# Patient Record
Sex: MALE | Race: WHITE | NOT HISPANIC OR LATINO | Employment: FULL TIME | URBAN - METROPOLITAN AREA
[De-identification: names, ages, dates, MRNs, and addresses within clinical notes are randomized per-mention and may not be internally consistent; named-entity substitution may affect disease eponyms.]

---

## 2019-03-14 ENCOUNTER — HOSPITAL ENCOUNTER (OUTPATIENT)
Dept: OTHER | Facility: HOSPITAL | Age: 49
Setting detail: SPECIMEN
Discharge: HOME | End: 2019-03-14
Attending: UROLOGY | Admitting: UROLOGY

## 2019-03-15 LAB — SPECIMEN SOURCE: NORMAL

## 2020-11-04 ENCOUNTER — HOSPITAL ENCOUNTER (EMERGENCY)
Facility: HOSPITAL | Age: 50
Discharge: HOME | End: 2020-11-04
Admitting: EMERGENCY MEDICINE

## 2020-11-04 ENCOUNTER — APPOINTMENT (OUTPATIENT)
Dept: CT IMAGING | Facility: HOSPITAL | Age: 50
End: 2020-11-04

## 2020-11-04 VITALS
DIASTOLIC BLOOD PRESSURE: 78 MMHG | HEART RATE: 80 BPM | TEMPERATURE: 98.5 F | RESPIRATION RATE: 16 BRPM | BODY MASS INDEX: 32.9 KG/M2 | WEIGHT: 235 LBS | HEIGHT: 71 IN | OXYGEN SATURATION: 98 % | SYSTOLIC BLOOD PRESSURE: 125 MMHG

## 2020-11-04 DIAGNOSIS — R20.0 LEFT FACIAL NUMBNESS: ICD-10-CM

## 2020-11-04 DIAGNOSIS — R42 DIZZINESS: Primary | ICD-10-CM

## 2020-11-04 LAB
ANION GAP SERPL CALCULATED.3IONS-SCNC: 7 MMOL/L (ref 5–15)
BASOPHILS # BLD AUTO: 0 10*3/MM3 (ref 0–0.2)
BASOPHILS NFR BLD AUTO: 0.4 % (ref 0–1.5)
BUN SERPL-MCNC: 20 MG/DL (ref 6–20)
BUN/CREAT SERPL: 23.3 (ref 7–25)
CALCIUM SPEC-SCNC: 8.5 MG/DL (ref 8.6–10.5)
CHLORIDE SERPL-SCNC: 110 MMOL/L (ref 98–107)
CO2 SERPL-SCNC: 26 MMOL/L (ref 22–29)
CREAT SERPL-MCNC: 0.86 MG/DL (ref 0.76–1.27)
DEPRECATED RDW RBC AUTO: 41.6 FL (ref 37–54)
EOSINOPHIL # BLD AUTO: 0.1 10*3/MM3 (ref 0–0.4)
EOSINOPHIL NFR BLD AUTO: 1.9 % (ref 0.3–6.2)
ERYTHROCYTE [DISTWIDTH] IN BLOOD BY AUTOMATED COUNT: 13.6 % (ref 12.3–15.4)
GFR SERPL CREATININE-BSD FRML MDRD: 95 ML/MIN/1.73
GLUCOSE SERPL-MCNC: 82 MG/DL (ref 65–99)
HCT VFR BLD AUTO: 47.8 % (ref 37.5–51)
HGB BLD-MCNC: 15.9 G/DL (ref 13–17.7)
LYMPHOCYTES # BLD AUTO: 0.9 10*3/MM3 (ref 0.7–3.1)
LYMPHOCYTES NFR BLD AUTO: 22.8 % (ref 19.6–45.3)
MCH RBC QN AUTO: 29 PG (ref 26.6–33)
MCHC RBC AUTO-ENTMCNC: 33.2 G/DL (ref 31.5–35.7)
MCV RBC AUTO: 87.2 FL (ref 79–97)
MONOCYTES # BLD AUTO: 0.3 10*3/MM3 (ref 0.1–0.9)
MONOCYTES NFR BLD AUTO: 8.5 % (ref 5–12)
NEUTROPHILS NFR BLD AUTO: 2.6 10*3/MM3 (ref 1.7–7)
NEUTROPHILS NFR BLD AUTO: 66.4 % (ref 42.7–76)
NRBC BLD AUTO-RTO: 0.1 /100 WBC (ref 0–0.2)
NT-PROBNP SERPL-MCNC: <5 PG/ML (ref 0–450)
PLATELET # BLD AUTO: 250 10*3/MM3 (ref 140–450)
PMV BLD AUTO: 7.9 FL (ref 6–12)
POTASSIUM SERPL-SCNC: 4.2 MMOL/L (ref 3.5–5.2)
RBC # BLD AUTO: 5.48 10*6/MM3 (ref 4.14–5.8)
SODIUM SERPL-SCNC: 143 MMOL/L (ref 136–145)
TROPONIN T SERPL-MCNC: <0.01 NG/ML (ref 0–0.03)
WBC # BLD AUTO: 3.9 10*3/MM3 (ref 3.4–10.8)

## 2020-11-04 PROCEDURE — 85025 COMPLETE CBC W/AUTO DIFF WBC: CPT | Performed by: PHYSICIAN ASSISTANT

## 2020-11-04 PROCEDURE — 93005 ELECTROCARDIOGRAM TRACING: CPT | Performed by: PHYSICIAN ASSISTANT

## 2020-11-04 PROCEDURE — 70450 CT HEAD/BRAIN W/O DYE: CPT

## 2020-11-04 PROCEDURE — 83880 ASSAY OF NATRIURETIC PEPTIDE: CPT | Performed by: PHYSICIAN ASSISTANT

## 2020-11-04 PROCEDURE — 84484 ASSAY OF TROPONIN QUANT: CPT | Performed by: PHYSICIAN ASSISTANT

## 2020-11-04 PROCEDURE — 80048 BASIC METABOLIC PNL TOTAL CA: CPT | Performed by: PHYSICIAN ASSISTANT

## 2020-11-04 PROCEDURE — 99284 EMERGENCY DEPT VISIT MOD MDM: CPT

## 2020-11-04 RX ORDER — MECLIZINE HYDROCHLORIDE 25 MG/1
25 TABLET ORAL ONCE
Status: COMPLETED | OUTPATIENT
Start: 2020-11-04 | End: 2020-11-04

## 2020-11-04 RX ORDER — MECLIZINE HYDROCHLORIDE 25 MG/1
25 TABLET ORAL 3 TIMES DAILY PRN
Qty: 20 TABLET | Refills: 0 | Status: SHIPPED | OUTPATIENT
Start: 2020-11-04

## 2020-11-04 RX ORDER — AMOXICILLIN AND CLAVULANATE POTASSIUM 875; 125 MG/1; MG/1
1 TABLET, FILM COATED ORAL 2 TIMES DAILY
Qty: 14 TABLET | Refills: 0 | Status: SHIPPED | OUTPATIENT
Start: 2020-11-04

## 2020-11-04 RX ORDER — SODIUM CHLORIDE 0.9 % (FLUSH) 0.9 %
10 SYRINGE (ML) INJECTION AS NEEDED
Status: DISCONTINUED | OUTPATIENT
Start: 2020-11-04 | End: 2020-11-04 | Stop reason: HOSPADM

## 2020-11-04 RX ADMIN — MECLIZINE HYDROCHLORIDE 25 MG: 25 TABLET ORAL at 14:23

## 2020-11-04 RX ADMIN — SODIUM CHLORIDE 1000 ML: 9 INJECTION, SOLUTION INTRAVENOUS at 14:23

## 2020-11-04 NOTE — ED PROVIDER NOTES
Subjective   History:  49-year-old male who presents to the ER with 1 week of dizziness feeling like he has vertigo and difficulty hearing out of his left ear and subjective sensation loss on the left side of his face.  He reports he has follow-up scheduled with ENT on 11/17/2020 but due to his Covid status he is to wait 2 weeks.  Reports his Covid symptoms have now resolved he had cough nausea but this seems to have diminished with time.    Onset: 1 week  Location: Left ear and left side of face  Duration: Constant  Character: Difficulty hearing and subjective sensation loss on left side of face  Aggravating/Alleviating factors: None   Radiationnone  Severity: Moderate            Review of Systems   Constitutional: Negative for chills, diaphoresis, fatigue and fever.   Respiratory: Negative for cough, choking and shortness of breath.    Cardiovascular: Negative for chest pain and palpitations.   Gastrointestinal: Negative for abdominal pain, nausea and vomiting.   Genitourinary: Negative.    Musculoskeletal: Negative.    Skin: Negative.    Neurological: Positive for dizziness and numbness. Negative for syncope and weakness.       No past medical history on file.    No Known Allergies    No past surgical history on file.    No family history on file.    Social History     Socioeconomic History   • Marital status:      Spouse name: Not on file   • Number of children: Not on file   • Years of education: Not on file   • Highest education level: Not on file           Objective   Physical Exam  Vitals signs and nursing note reviewed.   Constitutional:       Appearance: He is well-developed.   HENT:      Head: Normocephalic and atraumatic.      Ears:      Comments: Left tm: white opaque discoloration noted on left TM     Nose: Nose normal.   Eyes:      Pupils: Pupils are equal, round, and reactive to light.   Neck:      Musculoskeletal: Normal range of motion.   Pulmonary:      Effort: Pulmonary effort is normal.    Musculoskeletal: Normal range of motion.   Skin:     General: Skin is warm and dry.   Neurological:      General: No focal deficit present.      Mental Status: He is alert and oriented to person, place, and time. Mental status is at baseline.      Cranial Nerves: No cranial nerve deficit.      Sensory: No sensory deficit.      Motor: No weakness.   Psychiatric:         Mood and Affect: Mood normal.         Behavior: Behavior normal.         Thought Content: Thought content normal.         Judgment: Judgment normal.         Procedures           ED Course  ED Course as of Nov 04 1648 Wed Nov 04, 2020   1528 Discussed this case with Dr. Akhtar  who agrees with plan    [MG]   1546 EKG interpreted by ER physician reviewed myself sinus rhythm rate of 66.    [MG]      ED Course User Index  [MG] Kiera Castillo PA-C      Ct Head Without Contrast    Result Date: 11/4/2020  No acute intracranial findings.  Electronically Signed By-Dr. Ysabel Nolan MD On:11/4/2020 4:02 PM This report was finalized on [id number] by Dr. Ysabel Nolan MD.    Labs Reviewed   BASIC METABOLIC PANEL - Abnormal; Notable for the following components:       Result Value    Chloride 110 (*)     Calcium 8.5 (*)     All other components within normal limits    Narrative:     GFR Normal >60  Chronic Kidney Disease <60  Kidney Failure <15     CBC WITH AUTO DIFFERENTIAL - Normal   TROPONIN (IN-HOUSE) - Normal    Narrative:     Troponin T Reference Range:  <= 0.03 ng/mL-   Negative for AMI  >0.03 ng/mL-     Abnormal for myocardial necrosis.  Clinicians would have to utilize clinical acumen, EKG, Troponin and serial changes to determine if it is an Acute Myocardial Infarction or myocardial injury due to an underlying chronic condition.       Results may be falsely decreased if patient taking Biotin.     BNP (IN-HOUSE) - Normal    Narrative:     Among patients with dyspnea, NT-proBNP is highly sensitive for the detection of acute congestive heart  failure. In addition NT-proBNP of <300 pg/ml effectively rules out acute congestive heart failure with 99% negative predictive value.    Results may be falsely decreased if patient taking Biotin.     CBC AND DIFFERENTIAL    Narrative:     The following orders were created for panel order CBC & Differential.  Procedure                               Abnormality         Status                     ---------                               -----------         ------                     CBC Auto Differential[[id number]]        Normal              Final result                 Please view results for these tests on the individual orders.     Medications   sodium chloride 0.9 % flush 10 mL (has no administration in time range)   meclizine (ANTIVERT) tablet 25 mg (25 mg Oral Given 11/4/20 1423)   sodium chloride 0.9 % bolus 1,000 mL (0 mL Intravenous Stopped 11/4/20 1518)                                          MDM  Number of Diagnoses or Management Options  Dizziness:   Left facial numbness:   Diagnosis management comments: I examined the patient using the appropriate personal protective equipment.      DISPOSITION:   Chart Review:  Comorbidity:  has no past medical history on file.  Differentials:this list is not all inclusive and does not constitute the entirety of considered causes --> acoustic neuroma, viral or bacterial infection,Tympanosclerosis    Imaging: Was interpreted by physician and reviewed by myself:  Ct Head Without Contrast    Result Date: 11/4/2020  No acute intracranial findings.  Electronically Signed By-Dr. Ysabel Nolan MD On:11/4/2020 4:02 PM This report was finalized on [id number] by Dr. Ysabel Nolan MD.      Disposition/Treatment:    Patient is a 49-year-old male who presents to the ER with dizziness left-sided facial numbness and difficulty hearing out of his left ear.  Patient does have what appears to be an ossification on his tympanic membrane but no other objective findings on physical exam  he was started on antibiotics in case this is a bacterial infection he has an appointment scheduled with[facility] in 2 weeks which he will keep.  He was stable at time of discharge return precaution and follow-up instructions were provided he was in agreement with plan       Amount and/or Complexity of Data Reviewed  Clinical lab tests: reviewed  Tests in the medicine section of CPT®: reviewed    Patient Progress  Patient progress: stable      Final diagnoses:   Dizziness   Left facial numbness            Kiera Castillo PA-C  11/04/20 5927

## 2020-11-04 NOTE — ED NOTES
Pt complains of vertigo that he was already diagnosed with. Pt is covid positive, stated he had an earache, lost hearing on the left side Monday morning. Pt states know his face is numb but pain free.     Katt Moreno RN  11/04/20 [id number]

## 2020-11-06 LAB — QT INTERVAL: 397 MS

## 2024-04-06 ENCOUNTER — APPOINTMENT (OUTPATIENT)
Dept: GENERAL RADIOLOGY | Facility: HOSPITAL | Age: 54
End: 2024-04-06
Payer: COMMERCIAL

## 2024-04-06 ENCOUNTER — APPOINTMENT (OUTPATIENT)
Dept: NUCLEAR MEDICINE | Facility: HOSPITAL | Age: 54
End: 2024-04-06
Payer: COMMERCIAL

## 2024-04-06 ENCOUNTER — HOSPITAL ENCOUNTER (OUTPATIENT)
Facility: HOSPITAL | Age: 54
Discharge: HOME | End: 2024-04-06
Attending: EMERGENCY MEDICINE | Admitting: HOSPITALIST
Payer: COMMERCIAL

## 2024-04-06 VITALS
WEIGHT: 288.8 LBS | BODY MASS INDEX: 40.43 KG/M2 | RESPIRATION RATE: 15 BRPM | DIASTOLIC BLOOD PRESSURE: 68 MMHG | HEIGHT: 71 IN | HEART RATE: 58 BPM | SYSTOLIC BLOOD PRESSURE: 113 MMHG | OXYGEN SATURATION: 100 % | TEMPERATURE: 97.5 F

## 2024-04-06 DIAGNOSIS — R07.9 CHEST PAIN, UNSPECIFIED TYPE: Primary | ICD-10-CM

## 2024-04-06 LAB
ALBUMIN SERPL-MCNC: 4.3 G/DL (ref 3.5–5.2)
ALBUMIN/GLOB SERPL: 1.7 G/DL
ALP SERPL-CCNC: 65 U/L (ref 39–117)
ALT SERPL W P-5'-P-CCNC: 18 U/L (ref 1–41)
ANION GAP SERPL CALCULATED.3IONS-SCNC: 9 MMOL/L (ref 5–15)
AST SERPL-CCNC: 19 U/L (ref 1–40)
BASOPHILS # BLD AUTO: 0.03 10*3/MM3 (ref 0–0.2)
BASOPHILS NFR BLD AUTO: 0.6 % (ref 0–1.5)
BH CV REST NUCLEAR ISOTOPE DOSE: 9.8 MCI
BH CV STRESS BP STAGE 1: NORMAL
BH CV STRESS BP STAGE 2: NORMAL
BH CV STRESS BP STAGE 3: NORMAL
BH CV STRESS DURATION MIN STAGE 1: 3
BH CV STRESS DURATION MIN STAGE 2: 3
BH CV STRESS DURATION MIN STAGE 3: 3
BH CV STRESS DURATION SEC STAGE 1: 0
BH CV STRESS DURATION SEC STAGE 2: 0
BH CV STRESS DURATION SEC STAGE 3: 0
BH CV STRESS GRADE STAGE 1: 10
BH CV STRESS GRADE STAGE 2: 12
BH CV STRESS GRADE STAGE 3: 14
BH CV STRESS HR STAGE 1: 78
BH CV STRESS HR STAGE 2: 120
BH CV STRESS HR STAGE 3: 146
BH CV STRESS METS STAGE 1: 5
BH CV STRESS METS STAGE 2: 7.5
BH CV STRESS METS STAGE 3: 10
BH CV STRESS NUCLEAR ISOTOPE DOSE: 30 MCI
BH CV STRESS PROTOCOL 1: NORMAL
BH CV STRESS RECOVERY BP: NORMAL MMHG
BH CV STRESS RECOVERY HR: 85 BPM
BH CV STRESS SPEED STAGE 1: 1.7
BH CV STRESS SPEED STAGE 2: 2.5
BH CV STRESS SPEED STAGE 3: 3.4
BH CV STRESS STAGE 1: 1
BH CV STRESS STAGE 2: 2
BH CV STRESS STAGE 3: 3
BILIRUB SERPL-MCNC: 0.4 MG/DL (ref 0–1.2)
BUN SERPL-MCNC: 23 MG/DL (ref 6–20)
BUN/CREAT SERPL: 19.8 (ref 7–25)
CALCIUM SPEC-SCNC: 9.2 MG/DL (ref 8.6–10.5)
CHLORIDE SERPL-SCNC: 102 MMOL/L (ref 98–107)
CO2 SERPL-SCNC: 29 MMOL/L (ref 22–29)
CREAT SERPL-MCNC: 1.16 MG/DL (ref 0.76–1.27)
D DIMER PPP FEU-MCNC: 0.32 MG/L (FEU) (ref 0–0.51)
DEPRECATED RDW RBC AUTO: 42.8 FL (ref 37–54)
EGFRCR SERPLBLD CKD-EPI 2021: 76.3 ML/MIN/1.73
EOSINOPHIL # BLD AUTO: 0.15 10*3/MM3 (ref 0–0.4)
EOSINOPHIL NFR BLD AUTO: 3.2 % (ref 0.3–6.2)
ERYTHROCYTE [DISTWIDTH] IN BLOOD BY AUTOMATED COUNT: 13.7 % (ref 12.3–15.4)
GLOBULIN UR ELPH-MCNC: 2.6 GM/DL
GLUCOSE SERPL-MCNC: 107 MG/DL (ref 65–99)
HCT VFR BLD AUTO: 47 % (ref 37.5–51)
HGB BLD-MCNC: 14.8 G/DL (ref 13–17.7)
HOLD SPECIMEN: NORMAL
HOLD SPECIMEN: NORMAL
IMM GRANULOCYTES # BLD AUTO: 0.01 10*3/MM3 (ref 0–0.05)
IMM GRANULOCYTES NFR BLD AUTO: 0.2 % (ref 0–0.5)
LV EF NUC BP: 78 %
LYMPHOCYTES # BLD AUTO: 1.16 10*3/MM3 (ref 0.7–3.1)
LYMPHOCYTES NFR BLD AUTO: 25.1 % (ref 19.6–45.3)
MAGNESIUM SERPL-MCNC: 2.2 MG/DL (ref 1.6–2.6)
MAXIMAL PREDICTED HEART RATE: 169 BPM
MCH RBC QN AUTO: 27.3 PG (ref 26.6–33)
MCHC RBC AUTO-ENTMCNC: 31.5 G/DL (ref 31.5–35.7)
MCV RBC AUTO: 86.6 FL (ref 79–97)
MONOCYTES # BLD AUTO: 0.38 10*3/MM3 (ref 0.1–0.9)
MONOCYTES NFR BLD AUTO: 8.2 % (ref 5–12)
NEUTROPHILS NFR BLD AUTO: 2.9 10*3/MM3 (ref 1.7–7)
NEUTROPHILS NFR BLD AUTO: 62.7 % (ref 42.7–76)
NRBC BLD AUTO-RTO: 0 /100 WBC (ref 0–0.2)
PERCENT MAX PREDICTED HR: 86.39 %
PLATELET # BLD AUTO: 228 10*3/MM3 (ref 140–450)
PMV BLD AUTO: 9.6 FL (ref 6–12)
POTASSIUM SERPL-SCNC: 4 MMOL/L (ref 3.5–5.2)
PROT SERPL-MCNC: 6.9 G/DL (ref 6–8.5)
RBC # BLD AUTO: 5.43 10*6/MM3 (ref 4.14–5.8)
SODIUM SERPL-SCNC: 140 MMOL/L (ref 136–145)
STRESS BASELINE BP: NORMAL MMHG
STRESS BASELINE HR: 62 BPM
STRESS PERCENT HR: 102 %
STRESS POST EXERCISE DUR MIN: 6 MIN
STRESS POST EXERCISE DUR SEC: 30 SEC
STRESS POST PEAK BP: NORMAL MMHG
STRESS POST PEAK HR: 146 BPM
STRESS TARGET HR: 144 BPM
T4 FREE SERPL-MCNC: 1.13 NG/DL (ref 0.93–1.7)
TROPONIN T SERPL HS-MCNC: 7 NG/L
TROPONIN T SERPL HS-MCNC: <6 NG/L
TSH SERPL DL<=0.05 MIU/L-ACNC: 1.86 UIU/ML (ref 0.27–4.2)
WBC NRBC COR # BLD AUTO: 4.63 10*3/MM3 (ref 3.4–10.8)
WHOLE BLOOD HOLD COAG: NORMAL
WHOLE BLOOD HOLD SPECIMEN: NORMAL

## 2024-04-06 PROCEDURE — 93017 CV STRESS TEST TRACING ONLY: CPT

## 2024-04-06 PROCEDURE — A9502 TC99M TETROFOSMIN: HCPCS | Performed by: HOSPITALIST

## 2024-04-06 PROCEDURE — G0378 HOSPITAL OBSERVATION PER HR: HCPCS

## 2024-04-06 PROCEDURE — 0 TECHNETIUM TETROFOSMIN KIT: Performed by: HOSPITALIST

## 2024-04-06 PROCEDURE — 85379 FIBRIN DEGRADATION QUANT: CPT | Performed by: EMERGENCY MEDICINE

## 2024-04-06 PROCEDURE — 93018 CV STRESS TEST I&R ONLY: CPT | Performed by: INTERNAL MEDICINE

## 2024-04-06 PROCEDURE — 78452 HT MUSCLE IMAGE SPECT MULT: CPT

## 2024-04-06 PROCEDURE — 71045 X-RAY EXAM CHEST 1 VIEW: CPT

## 2024-04-06 PROCEDURE — 78452 HT MUSCLE IMAGE SPECT MULT: CPT | Performed by: INTERNAL MEDICINE

## 2024-04-06 PROCEDURE — 83735 ASSAY OF MAGNESIUM: CPT | Performed by: EMERGENCY MEDICINE

## 2024-04-06 PROCEDURE — 84484 ASSAY OF TROPONIN QUANT: CPT | Performed by: EMERGENCY MEDICINE

## 2024-04-06 PROCEDURE — 84439 ASSAY OF FREE THYROXINE: CPT | Performed by: EMERGENCY MEDICINE

## 2024-04-06 PROCEDURE — 36415 COLL VENOUS BLD VENIPUNCTURE: CPT

## 2024-04-06 PROCEDURE — 99285 EMERGENCY DEPT VISIT HI MDM: CPT

## 2024-04-06 PROCEDURE — 93005 ELECTROCARDIOGRAM TRACING: CPT

## 2024-04-06 PROCEDURE — 93016 CV STRESS TEST SUPVJ ONLY: CPT | Performed by: INTERNAL MEDICINE

## 2024-04-06 PROCEDURE — 80050 GENERAL HEALTH PANEL: CPT | Performed by: EMERGENCY MEDICINE

## 2024-04-06 PROCEDURE — 84484 ASSAY OF TROPONIN QUANT: CPT | Performed by: HOSPITALIST

## 2024-04-06 RX ORDER — NITROGLYCERIN 0.4 MG/1
0.4 TABLET SUBLINGUAL
Status: DISCONTINUED | OUTPATIENT
Start: 2024-04-06 | End: 2024-04-06 | Stop reason: HOSPADM

## 2024-04-06 RX ORDER — ASPIRIN 325 MG
325 TABLET ORAL ONCE
Status: COMPLETED | OUTPATIENT
Start: 2024-04-06 | End: 2024-04-06

## 2024-04-06 RX ORDER — BISACODYL 10 MG
10 SUPPOSITORY, RECTAL RECTAL DAILY PRN
Status: DISCONTINUED | OUTPATIENT
Start: 2024-04-06 | End: 2024-04-06 | Stop reason: HOSPADM

## 2024-04-06 RX ORDER — NALOXONE HCL 0.4 MG/ML
0.4 VIAL (ML) INJECTION
Status: DISCONTINUED | OUTPATIENT
Start: 2024-04-06 | End: 2024-04-06 | Stop reason: HOSPADM

## 2024-04-06 RX ORDER — SODIUM CHLORIDE 0.9 % (FLUSH) 0.9 %
10 SYRINGE (ML) INJECTION AS NEEDED
Status: DISCONTINUED | OUTPATIENT
Start: 2024-04-06 | End: 2024-04-06 | Stop reason: HOSPADM

## 2024-04-06 RX ORDER — SODIUM CHLORIDE 9 MG/ML
100 INJECTION, SOLUTION INTRAVENOUS CONTINUOUS
Status: DISCONTINUED | OUTPATIENT
Start: 2024-04-06 | End: 2024-04-06 | Stop reason: HOSPADM

## 2024-04-06 RX ORDER — AMOXICILLIN 250 MG
2 CAPSULE ORAL 2 TIMES DAILY
Status: DISCONTINUED | OUTPATIENT
Start: 2024-04-06 | End: 2024-04-06 | Stop reason: HOSPADM

## 2024-04-06 RX ORDER — ESCITALOPRAM OXALATE 20 MG/1
20 TABLET ORAL DAILY
COMMUNITY

## 2024-04-06 RX ORDER — OMEPRAZOLE 40 MG/1
40 CAPSULE, DELAYED RELEASE ORAL DAILY
COMMUNITY

## 2024-04-06 RX ORDER — BISACODYL 5 MG/1
5 TABLET, DELAYED RELEASE ORAL DAILY PRN
Status: DISCONTINUED | OUTPATIENT
Start: 2024-04-06 | End: 2024-04-06 | Stop reason: HOSPADM

## 2024-04-06 RX ORDER — POLYETHYLENE GLYCOL 3350 17 G/17G
17 POWDER, FOR SOLUTION ORAL DAILY PRN
Status: DISCONTINUED | OUTPATIENT
Start: 2024-04-06 | End: 2024-04-06 | Stop reason: HOSPADM

## 2024-04-06 RX ORDER — SODIUM CHLORIDE 0.9 % (FLUSH) 0.9 %
10 SYRINGE (ML) INJECTION EVERY 12 HOURS SCHEDULED
Status: DISCONTINUED | OUTPATIENT
Start: 2024-04-06 | End: 2024-04-06 | Stop reason: HOSPADM

## 2024-04-06 RX ORDER — ASPIRIN 81 MG/1
81 TABLET ORAL DAILY
Status: DISCONTINUED | OUTPATIENT
Start: 2024-04-07 | End: 2024-04-06 | Stop reason: HOSPADM

## 2024-04-06 RX ORDER — SODIUM CHLORIDE 9 MG/ML
40 INJECTION, SOLUTION INTRAVENOUS AS NEEDED
Status: DISCONTINUED | OUTPATIENT
Start: 2024-04-06 | End: 2024-04-06 | Stop reason: HOSPADM

## 2024-04-06 RX ADMIN — TETROFOSMIN 1 DOSE: 1.38 INJECTION, POWDER, LYOPHILIZED, FOR SOLUTION INTRAVENOUS at 13:55

## 2024-04-06 RX ADMIN — ASPIRIN 325 MG ORAL TABLET 325 MG: 325 PILL ORAL at 08:08

## 2024-04-06 RX ADMIN — TETROFOSMIN 1 DOSE: 1.38 INJECTION, POWDER, LYOPHILIZED, FOR SOLUTION INTRAVENOUS at 12:56

## 2024-04-06 NOTE — Clinical Note
Level of Care: Telemetry [5]   Admitting Physician: KADE CALDERON [[id number]]   Attending Physician: KADE CALDERON [[id number]]

## 2024-04-06 NOTE — H&P
[facility] Medicine Services  History & Physical    Patient Name: Los Thomas  : 1970  MRN: [id number]  Primary Care Physician:  Hussein Gallego MD  Date of admission: 2024  Date and Time of Service: 2024 at 12:55 EDT      Subjective      Chief Complaint: Chest Pain    History of Present Illness: Los Thomas is a 53 y.o. male with a PMH of Depression and GERD who presented to [facility] on 2024 with  c/o chest pain.  Patient stated that he has had chest pain on and off for the past 1 week. He is also had some palpitations with his Apple Watch reading his heart rate in the low 100s.  Patient states that his chest pain is located in the epigastric region and rates as a 6/10.  States that the pain is nonradiating.  Had associated shortness of breath this morning with chest pain prompting him to come to the ED.  Denies any other symptoms.  Denies any history of cardiac disease.       Review of Systems  As above  Personal History     No past medical history on file.    No past surgical history on file.    Family History: family history is not on file. Otherwise pertinent FHx was reviewed and not pertinent to current issue.    Social History:      Home Medications:  Prior to Admission Medications       Prescriptions Last Dose Informant Patient Reported? Taking?    escitalopram (LEXAPRO) 20 MG tablet 2024  Yes Yes    Take 1 tablet by mouth Daily.    omeprazole (priLOSEC) 40 MG capsule 2024  Yes Yes    Take 1 capsule by mouth Daily.              Allergies:  No Known Allergies    Objective      Vitals:   Temp:  [98.1 °F (36.7 °C)] 98.1 °F (36.7 °C)  Heart Rate:  [66-79] 71  Resp:  [20] 20  BP: (111-144)/(75-91) 121/82  Body mass index is 40.28 kg/m².  Physical Exam  General Appearance: AOO x 4, cooperative, no distress, appropriate for age  Head:  Normocephalic, without obvious abnormality  Eyes:  PERRL, EOM's intact, conjunctivae and cornea clear  Nose:  Nares  symmetrical, septum midline, mucosa pink  Throat:  Lips, tongue, and mucosa are moist, pink, and intact  Neck:  Supple; symmetrical, trachea midline, no adenopathy  Back:  Symmetrical, ROM normal, no CVA tenderness  Lungs: Respirations unlabored, no audible wheeze  Heart: Regular rate & rhythm, S1 and S2 normal  Abdomen:  Soft, nontender, bowel sounds active all four quadrants  Musculoskeletal: Tone and strength strong and symmetrical, all extremities; no joint pain or edema         Skin/Hair/Nails:  Skin warm, dry and intact, no rashes or abnormal dyspigmentation     Diagnostic Data:  Lab Results (last 24 hours)       Procedure Component Value Units Date/Time    Novi Draw [[id number]] Collected: 04/06/24 0758    Specimen: Blood Updated: 04/06/24 0901    Narrative:      The following orders were created for panel order Novi Draw.  Procedure                               Abnormality         Status                     ---------                               -----------         ------                     Green Top (Gel)[[id number]]                                  Final result               Lavender Top[[id number]]                                     Final result               Gold Top - SST[[id number]]                                   Final result               Light Blue Top[[id number]]                                   Final result                 Please view results for these tests on the individual orders.    Green Top (Gel) [[id number]] Collected: 04/06/24 0758    Specimen: Blood Updated: 04/06/24 0901     Extra Tube Hold for add-ons.     Comment: Auto resulted.       Lavender Top [[id number]] Collected: 04/06/24 0758    Specimen: Blood Updated: 04/06/24 0901     Extra Tube hold for add-on     Comment: Auto resulted       Gold Top - SST [[id number]] Collected: 04/06/24 0758    Specimen: Blood Updated: 04/06/24 0901     Extra Tube Hold for add-ons.     Comment: Auto resulted.       Light Blue Top [[id number]]  Collected: 04/06/24 0758    Specimen: Blood Updated: 04/06/24 0901     Extra Tube Hold for add-ons.     Comment: Auto resulted       Single High Sensitivity Troponin T [[id number]]  (Normal) Collected: 04/06/24 0758    Specimen: Blood Updated: 04/06/24 0843     HS Troponin T 7 ng/L     Narrative:      High Sensitive Troponin T Reference Range:  <14.0 ng/L- Negative Female for AMI  <22.0 ng/L- Negative Male for AMI  >=14 - Abnormal Female indicating possible myocardial injury.  >=22 - Abnormal Male indicating possible myocardial injury.   Clinicians would have to utilize clinical acumen, EKG, Troponin, and serial changes to determine if it is an Acute Myocardial Infarction or myocardial injury due to an underlying chronic condition.         TSH [[id number]]  (Normal) Collected: 04/06/24 0758    Specimen: Blood Updated: 04/06/24 0843     TSH 1.860 uIU/mL     Comprehensive Metabolic Panel [[id number]]  (Abnormal) Collected: 04/06/24 0758    Specimen: Blood Updated: 04/06/24 0836     Glucose 107 mg/dL      BUN 23 mg/dL      Creatinine 1.16 mg/dL      Sodium 140 mmol/L      Potassium 4.0 mmol/L      Chloride 102 mmol/L      CO2 29.0 mmol/L      Calcium 9.2 mg/dL      Total Protein 6.9 g/dL      Albumin 4.3 g/dL      ALT (SGPT) 18 U/L      AST (SGOT) 19 U/L      Alkaline Phosphatase 65 U/L      Total Bilirubin 0.4 mg/dL      Globulin 2.6 gm/dL      A/G Ratio 1.7 g/dL      BUN/Creatinine Ratio 19.8     Anion Gap 9.0 mmol/L      eGFR 76.3 mL/min/1.73     Narrative:      GFR Normal >60  Chronic Kidney Disease <60  Kidney Failure <15      Magnesium [[id number]]  (Normal) Collected: 04/06/24 0758    Specimen: Blood Updated: 04/06/24 0836     Magnesium 2.2 mg/dL     D-dimer, Quantitative [[id number]]  (Normal) Collected: 04/06/24 0758    Specimen: Blood Updated: 04/06/24 0822     D-Dimer, Quantitative 0.32 mg/L (FEU)     Narrative:      According to the assay 's published package insert, a normal (<0.50 mg/L (FEU))  "D-dimer result in conjunction with a non-high clinical probability assessment, excludes deep vein thrombosis (DVT) and pulmonary embolism (PE) with high sensitivity.    D-dimer values increase with age and this can make VTE exclusion of an older population difficult. To address this, the [employer], based on best available evidence and recent guidelines, recommends that clinicians use age-adjusted D-dimer thresholds in patients greater than 50 years of age with: a) a low probability of PE who do not meet all Pulmonary Embolism Rule Out Criteria, or b) in those with intermediate probability of PE.   The formula for an age-adjusted D-dimer cut-off is \"age/100\".  For example, a 60 year old patient would have an age-adjusted cut-off of 0.60 mg/L (FEU) and an 80 year old 0.80 mg/L (FEU).    CBC & Differential [[id number]]  (Normal) Collected: 04/06/24 0758    Specimen: Blood Updated: 04/06/24 0812    Narrative:      The following orders were created for panel order CBC & Differential.  Procedure                               Abnormality         Status                     ---------                               -----------         ------                     CBC Auto Differential[[id number]]        Normal              Final result                 Please view results for these tests on the individual orders.    CBC Auto Differential [[id number]]  (Normal) Collected: 04/06/24 0758    Specimen: Blood Updated: 04/06/24 0812     WBC 4.63 10*3/mm3      RBC 5.43 10*6/mm3      Hemoglobin 14.8 g/dL      Hematocrit 47.0 %      MCV 86.6 fL      MCH 27.3 pg      MCHC 31.5 g/dL      RDW 13.7 %      RDW-SD 42.8 fl      MPV 9.6 fL      Platelets 228 10*3/mm3      Neutrophil % 62.7 %      Lymphocyte % 25.1 %      Monocyte % 8.2 %      Eosinophil % 3.2 %      Basophil % 0.6 %      Immature Grans % 0.2 %      Neutrophils, Absolute 2.90 10*3/mm3      Lymphocytes, Absolute 1.16 10*3/mm3      Monocytes, Absolute 0.38 10*3/mm3  "     Eosinophils, Absolute 0.15 10*3/mm3      Basophils, Absolute 0.03 10*3/mm3      Immature Grans, Absolute 0.01 10*3/mm3      nRBC 0.0 /100 WBC     T4, Free [[id number]] Collected: 04/06/24 0758    Specimen: Blood Updated: 04/06/24 0810             Imaging Results (Last 24 Hours)       Procedure Component Value Units Date/Time    XR Chest 1 View [[id number]] Collected: 04/06/24 0854     Updated: 04/06/24 0857    Narrative:        XR CHEST 1 VW    Date of Exam: 4/6/2024 8:15 AM EDT    Indication: chest pain, palpitations    Comparison: February 11, 2011    FINDINGS:  No definite focal or diffuse pulmonary infiltrate is identified.  No pneumothorax or significant pleural effusion.  Heart size and mediastinal contour appear within normal limits.  No definite osseous abnormality is seen on this limited single view.      Impression:      No radiographic findings of acute cardiopulmonary abnormality.      Electronically Signed: Ton Arpit    4/6/2024 8:55 AM EDT    Workstation ID: [id number]              Assessment & Plan        This is a 53 y.o. male with PMH of    Active and Resolved Problems  Active Hospital Problems    Diagnosis  POA    **Chest pain [R07.9]  Yes      Resolved Hospital Problems   No resolved problems to display.       Chest pain  Troponin negative  Trend troponin  Make patient n.p.o.  Obtain stress test  Add aspirin and morphine   Possible discharge today if stress test is negative    GERD  Resume Protonix    Depression  Resume Lexapro    DVT prophylaxis:  No DVT prophylaxis order currently exists.          CODE STATUS:           Admission Status:  I believe this patient meets Obs status.      I discussed the patient's findings and my recommendations with patient.      Signature:     This document has been electronically signed by Rita Albright MD on April 6, 2024 10:08 EDT   [facility] Hospitalist Team

## 2024-04-06 NOTE — ED NOTES
Nursing report ED to floor  Los Thomas  53 y.o.  male    HPI:   Chief Complaint   Patient presents with    Chest Pain       Admitting doctor:   Rita Albright MD    Admitting diagnosis:   The encounter diagnosis was Chest pain, unspecified type.    Code status:   Current Code Status       Date Active Code Status Order ID Comments User Context       Not on file            Allergies:   Patient has no known allergies.    Isolation:  No active isolations     Fall Risk:  Fall Risk Assessment was completed, and patient is at low risk for falls.   Predictive Model Details         3 (Low) Factor Value    Calculated 4/6/2024 12:08 Age 53    Risk of Fall Model Musculoskeletal Assessment WDL     Active Peripheral IV Present     Imaging order in this encounter Present     Respiratory Rate 20     Magnesium 2.2 mg/dL     Drug Use Not Asked     Skin Assessment WDL     Diastolic BP 71     Feliciano Scale not on file     Financial Class Private Insurance     Peripheral Vascular Assessment WDL     Tobacco Use Not Asked     Clinically Relevant Sex Not Female     Gastrointestinal Assessment WDL     Number of Distinct Medication Classes administered 1     Calcium 9.2 mg/dL     Chloride 102 mmol/L     Cardiac Assessment WDL     Creatinine 1.16 mg/dL     Days after Admission 0.181     Albumin 4.3 g/dL     Total Bilirubin 0.4 mg/dL     Potassium 4 mmol/L     ALT 18 U/L         Weight:       04/06/24  0743   Weight: 131 kg (288 lb 12.8 oz)       Intake and Output  No intake or output data in the 24 hours ending 04/06/24 1208    Diet:   Dietary Orders (From admission, onward)       Start     Ordered    04/06/24 1007  NPO Diet NPO Type: Strict NPO  Diet Effective Now        Question:  NPO Type  Answer:  Strict NPO    04/06/24 1007                     Most recent vitals:   Vitals:    04/06/24 1116 04/06/24 1130 04/06/24 1145 04/06/24 1201   BP: 108/66 112/71 117/75 111/71   Pulse: 65 64 74 60   Resp:       Temp:   98.1 °F (36.7 °C)     TempSrc:   Oral    SpO2: 95% 96% 98% 96%   Weight:       Height:           Active LDAs/IV Access:   Lines, Drains & Airways       Active LDAs       Name Placement date Placement time Site Days    Peripheral IV 04/06/24 0759 Left Antecubital 04/06/24  0759  Antecubital  less than 1                    Skin Condition:   Skin Assessments (last day)       None             Labs (abnormal labs have a star):   Labs Reviewed   COMPREHENSIVE METABOLIC PANEL - Abnormal; Notable for the following components:       Result Value    Glucose 107 (*)     BUN 23 (*)     All other components within normal limits    Narrative:     GFR Normal >60  Chronic Kidney Disease <60  Kidney Failure <15     SINGLE HS TROPONIN T - Normal    Narrative:     High Sensitive Troponin T Reference Range:  <14.0 ng/L- Negative Female for AMI  <22.0 ng/L- Negative Male for AMI  >=14 - Abnormal Female indicating possible myocardial injury.  >=22 - Abnormal Male indicating possible myocardial injury.   Clinicians would have to utilize clinical acumen, EKG, Troponin, and serial changes to determine if it is an Acute Myocardial Infarction or myocardial injury due to an underlying chronic condition.        D-DIMER, QUANTITATIVE - Normal    Narrative:     According to the assay 's published package insert, a normal (<0.50 mg/L (FEU)) D-dimer result in conjunction with a non-high clinical probability assessment, excludes deep vein thrombosis (DVT) and pulmonary embolism (PE) with high sensitivity.    D-dimer values increase with age and this can make VTE exclusion of an older population difficult. To address this, the [employer], based on best available evidence and recent guidelines, recommends that clinicians use age-adjusted D-dimer thresholds in patients greater than 50 years of age with: a) a low probability of PE who do not meet all Pulmonary Embolism Rule Out Criteria, or b) in those with intermediate probability of PE.  "  The formula for an age-adjusted D-dimer cut-off is \"age/100\".  For example, a 60 year old patient would have an age-adjusted cut-off of 0.60 mg/L (FEU) and an 80 year old 0.80 mg/L (FEU).   MAGNESIUM - Normal   TSH - Normal   T4, FREE - Normal    Narrative:     Results may be falsely increased if patient taking Biotin.     CBC WITH AUTO DIFFERENTIAL - Normal   RAINBOW DRAW    Narrative:     The following orders were created for panel order [place] Draw.  Procedure                               Abnormality         Status                     ---------                               -----------         ------                     Green Top (Gel)[[id number]]                                  Final result               Lavender Top[[id number]]                                     Final result               Gold Top - SST[[id number]]                                   Final result               Light Blue Top[[id number]]                                   Final result                 Please view results for these tests on the individual orders.   SINGLE HS TROPONIN T   CBC AND DIFFERENTIAL    Narrative:     The following orders were created for panel order CBC & Differential.  Procedure                               Abnormality         Status                     ---------                               -----------         ------                     CBC Auto Differential[[id number]]        Normal              Final result                 Please view results for these tests on the individual orders.   GREEN TOP   LAVENDER TOP   GOLD TOP - SST   LIGHT BLUE TOP       LOC: Person, Place, Time, and Situation    Telemetry:  Telemetry    Cardiac Monitoring Ordered: yes    EKG:   ECG 12 Lead Chest Pain   Preliminary Result   HEART RATE= 75  bpm   RR Interval= 804  ms   NH Interval= 141  ms   P Horizontal Axis= 47  deg   P Front Axis= 43  deg   QRSD Interval= 103  ms   QT Interval= 382  ms   QTcB= 426  ms   QRS Axis= -9  deg   T Wave Axis= " 22  deg   - NORMAL ECG -   Sinus rhythm   Electronically Signed By:    Date and Time of Study: 2024-04-06 07:43:06          Medications Given in the ED:   Medications   sodium chloride 0.9 % flush 10 mL (has no administration in time range)   nitroglycerin (NITROSTAT) SL tablet 0.4 mg (has no administration in time range)   sodium chloride 0.9 % flush 10 mL (has no administration in time range)   sodium chloride 0.9 % flush 10 mL (has no administration in time range)   sodium chloride 0.9 % infusion 40 mL (has no administration in time range)   sennosides-docusate (PERICOLACE) 8.6-50 MG per tablet 2 tablet (has no administration in time range)     And   polyethylene glycol (MIRALAX) packet 17 g (has no administration in time range)     And   bisacodyl (DULCOLAX) EC tablet 5 mg (has no administration in time range)     And   bisacodyl (DULCOLAX) suppository 10 mg (has no administration in time range)   sodium chloride 0.9 % infusion (has no administration in time range)   morphine injection 4 mg (has no administration in time range)     And   naloxone (NARCAN) injection 0.4 mg (has no administration in time range)   aspirin tablet 325 mg (325 mg Oral Given 4/6/24 0808)       Imaging results:  XR Chest 1 View    Result Date: 4/6/2024  No radiographic findings of acute cardiopulmonary abnormality. Electronically Signed: Ton Munson  4/6/2024 8:55 AM EDT  Workstation ID: [id number]     Social issues:   Social History     Socioeconomic History    Marital status:        NIH Stroke Scale:  Interval: (not recorded)  1a. Level of Consciousness: (not recorded)  1b. LOC Questions: (not recorded)  1c. LOC Commands: (not recorded)  2. Best Gaze: (not recorded)  3. Visual: (not recorded)  4. Facial Palsy: (not recorded)  5a. Motor Arm, Left: (not recorded)  5b. Motor Arm, Right: (not recorded)  6a. Motor Leg, Left: (not recorded)  6b. Motor Leg, Right: (not recorded)  7. Limb Ataxia: (not recorded)  8. Sensory: (not  recorded)  9. Best Language: (not recorded)  10. Dysarthria: (not recorded)  11. Extinction and Inattention (formerly Neglect): (not recorded)    Total (NIH Stroke Scale): (not recorded)     Additional notable assessment information:      Nursing report ED to floor:  RONALD Chang RN   04/06/24 12:08 EDT

## 2024-04-06 NOTE — ED NOTES
Per Ryne with nuclear medicine, pt needs second troponin drawn STAT before stress test may be performed. Report given to Ryne, order for second troponin in.

## 2024-04-06 NOTE — DISCHARGE SUMMARY
"             [facility] Medicine Services  Discharge Summary    Date of Service: 24    Patient Name: Los Thomas  : 1970  MRN: [id number]    Date of Admission: 2024  Discharge Diagnosis: Chest pain  Date of Discharge:  24    Primary Care Physician: Hussein Gallego MD      Presenting Problem:   Chest pain [R07.9]  Chest pain, unspecified type [R07.9]    Active and Resolved Hospital Problems:  Active Hospital Problems    Diagnosis POA    **Chest pain [R07.9] Yes      Resolved Hospital Problems   No resolved problems to display.         Hospital Course     HPI:  Per the H&P written by Dr. Albright, dated 24:  \"Los Thomas is a 53 y.o. male with a PMH of Depression and GERD who presented to [facility] on 2024 with  c/o chest pain.  Patient stated that he has had chest pain on and off for the past 1 week. He is also had some palpitations with his Apple Watch reading his heart rate in the low 100s.  Patient states that his chest pain is located in the epigastric region and rates as a 6/10.  States that the pain is nonradiating.  Had associated shortness of breath this morning with chest pain prompting him to come to the ED.  Denies any other symptoms.  Denies any history of cardiac disease. \"    Hospital Course:  Patient was admitted and troponin trended.  He was made n.p.o. and stress test ordered.  Pulmonary remained negative.  Patient underwent stress test which was unrevealing for any coronary artery disease.  Patient's chest pain has resolved. Patient instructed to continue taking his omeprazole for GERD as chest pain might be related to his GERD.  Discharged in stable condition.      DISCHARGE Follow Up Recommendations for labs and diagnostics:       Reasons For Change In Medications and Indications for New Medications:      Day of Discharge     Vital Signs:  Temp:  [97.5 °F (36.4 °C)-98.1 °F (36.7 °C)] 97.5 °F (36.4 °C)  Heart Rate:  [58-79] 58  Resp:  " [15-20] 15  BP: (108-144)/(65-91) 113/68    Physical Exam:  Physical Exam   General Appearance: AOO x 4, cooperative, no distress, appropriate for age  Head:  Normocephalic, without obvious abnormality  Eyes:  PERRL, EOM's intact, conjunctivae and cornea clear  Nose:  Nares symmetrical, septum midline, mucosa pink  Throat:  Lips, tongue, and mucosa are moist, pink, and intact  Neck:  Supple; symmetrical, trachea midline, no adenopathy  Back:  Symmetrical, ROM normal, no CVA tenderness  Lungs: Respirations unlabored, no audible wheeze  Heart: Regular rate & rhythm, S1 and S2 normal  Abdomen:  Soft, nontender, bowel sounds active all four quadrants  Musculoskeletal: Tone and strength strong and symmetrical, all extremities; no joint pain or edema         Skin/Hair/Nails:  Skin warm, dry and intact, no rashes or abnormal dyspigmentation         Pertinent  and/or Most Recent Results     LAB RESULTS:      Lab 04/06/24  0758   WBC 4.63   HEMOGLOBIN 14.8   HEMATOCRIT 47.0   PLATELETS 228   NEUTROS ABS 2.90   IMMATURE GRANS (ABS) 0.01   LYMPHS ABS 1.16   MONOS ABS 0.38   EOS ABS 0.15   MCV 86.6         Lab 04/06/24  0758   SODIUM 140   POTASSIUM 4.0   CHLORIDE 102   CO2 29.0   ANION GAP 9.0   BUN 23*   CREATININE 1.16   EGFR 76.3   GLUCOSE 107*   CALCIUM 9.2   MAGNESIUM 2.2   TSH 1.860         Lab 04/06/24  0758   TOTAL PROTEIN 6.9   ALBUMIN 4.3   GLOBULIN 2.6   ALT (SGPT) 18   AST (SGOT) 19   BILIRUBIN 0.4   ALK PHOS 65         Lab 04/06/24  1213 04/06/24  0758   HSTROP T <6 7                 Brief Urine Lab Results       None          Microbiology Results (last 10 days)       ** No results found for the last 240 hours. **            XR Chest 1 View    Result Date: 4/6/2024  Impression: No radiographic findings of acute cardiopulmonary abnormality. Electronically Signed: Ton Munson  4/6/2024 8:55 AM EDT  Workstation ID: [id number]                 Labs Pending at Discharge:      Procedures Performed           Consults:    Consults       No orders found from 3/8/2024 to 4/7/2024.              Discharge Details        Discharge Medications        Continue These Medications        Instructions Start Date   escitalopram 20 MG tablet  Commonly known as: LEXAPRO   20 mg, Oral, Daily      omeprazole 40 MG capsule  Commonly known as: priLOSEC   40 mg, Oral, Daily               No Known Allergies      Discharge Disposition:   Home or Self Care    Diet:  Hospital:  Diet Order   Procedures    NPO Diet NPO Type: Strict NPO         Discharge Activity: as tolerated         CODE STATUS:  There are no questions and answers to display.         No future appointments.        Time spent on Discharge including face to face service:  >30 minutes    Signature: Electronically signed by Rita Albright MD, 04/06/24, 17:41 EDT.  [facility] Hospitalist Team

## 2024-04-06 NOTE — PLAN OF CARE
Problem: Adult Inpatient Plan of Care  Goal: Plan of Care Review  Outcome: Ongoing, Progressing  Goal: Patient-Specific Goal (Individualized)  Outcome: Ongoing, Progressing  Goal: Absence of Hospital-Acquired Illness or Injury  Outcome: Ongoing, Progressing  Goal: Optimal Comfort and Wellbeing  Outcome: Ongoing, Progressing  Goal: Readiness for Transition of Care  Outcome: Ongoing, Progressing  Intervention: Mutually Develop Transition Plan  Recent Flowsheet Documentation  Taken 4/6/2024 1303 by Geno Watkins, RN  Equipment Currently Used at Home: none  Transportation Anticipated: car, drives self  Patient/Family Anticipated Services at Transition: none  Patient/Family Anticipates Transition to: home   Goal Outcome Evaluation:

## 2024-04-06 NOTE — ED PROVIDER NOTES
Subjective   History of Present Illness  53-year-old male prior medical history of schwannoma that was proved in 2022 as well as GERD presents for intermittent chest pain and shortness of breath.  States shortness of breath been worse with exertion for the last couple days.  Symptoms overall been going on for couple weeks.  States occasionally feels like he is having palpitations but looks at his Apple Watch and his heart rate is never greater than 100 at rest.  States he feels like he has been sweating more but it is not necessarily associated with his shortness of breath or palpitations.  No nausea or vomiting.  No fevers.  States he does have a family history of heart disease but people have been in their 70s usually when they are diagnosed.  Patient does endorse a history of hyperlipidemia Juli does not take any medications for this.  States he is never followed with a cardiologist.      Review of Systems  See HPI.  Past Medical History:   Diagnosis Date    GERD (gastroesophageal reflux disease)        No Known Allergies    Past Surgical History:   Procedure Laterality Date    BRAIN TUMOR EXCISION         History reviewed. No pertinent family history.    Social History     Socioeconomic History    Marital status:    Tobacco Use    Smoking status: Never     Passive exposure: Never    Smokeless tobacco: Never   Substance and Sexual Activity    Alcohol use: Never    Drug use: Never    Sexual activity: Defer           Objective   Physical Exam  Constitutional:  No acute distress.  Head:  Atraumatic.  Normocephalic.   Eyes:  No scleral icterus. Normal conjunctivae  ENT:  Moist mucosa.  No nasal discharge present.  Cardiovascular:  Well perfused.  Equal pulses.  Regular rhythm and normal rate.  Normal capillary refill.    Pulmonary/Chest:  No respiratory distress.  Airway patent.  No tachypnea.  No accessory muscle usage.  Clear to auscultation bilaterally.  Abdominal:  Nondistended. Nontender.   Extremities:   " No Deformity  Skin:  Warm, dry  Neurological:  Alert, awake, and appropriate.  Normal speech.      Procedures           ED Course      /68 (BP Location: Left arm, Patient Position: Sitting)   Pulse 58   Temp 97.5 °F (36.4 °C) (Oral)   Resp 15   Ht 180.3 cm (71\")   Wt 131 kg (288 lb 12.8 oz)   SpO2 100%   BMI 40.28 kg/m²   Labs Reviewed   COMPREHENSIVE METABOLIC PANEL - Abnormal; Notable for the following components:       Result Value    Glucose 107 (*)     BUN 23 (*)     All other components within normal limits    Narrative:     GFR Normal >60  Chronic Kidney Disease <60  Kidney Failure <15     SINGLE HS TROPONIN T - Normal    Narrative:     High Sensitive Troponin T Reference Range:  <14.0 ng/L- Negative Female for AMI  <22.0 ng/L- Negative Male for AMI  >=14 - Abnormal Female indicating possible myocardial injury.  >=22 - Abnormal Male indicating possible myocardial injury.   Clinicians would have to utilize clinical acumen, EKG, Troponin, and serial changes to determine if it is an Acute Myocardial Infarction or myocardial injury due to an underlying chronic condition.        D-DIMER, QUANTITATIVE - Normal    Narrative:     According to the assay 's published package insert, a normal (<0.50 mg/L (FEU)) D-dimer result in conjunction with a non-high clinical probability assessment, excludes deep vein thrombosis (DVT) and pulmonary embolism (PE) with high sensitivity.    D-dimer values increase with age and this can make VTE exclusion of an older population difficult. To address this, the [employer], based on best available evidence and recent guidelines, recommends that clinicians use age-adjusted D-dimer thresholds in patients greater than 50 years of age with: a) a low probability of PE who do not meet all Pulmonary Embolism Rule Out Criteria, or b) in those with intermediate probability of PE.   The formula for an age-adjusted D-dimer cut-off is \"age/100\".  For " example, a 60 year old patient would have an age-adjusted cut-off of 0.60 mg/L (FEU) and an 80 year old 0.80 mg/L (FEU).   MAGNESIUM - Normal   TSH - Normal   T4, FREE - Normal    Narrative:     Results may be falsely increased if patient taking Biotin.     CBC WITH AUTO DIFFERENTIAL - Normal   SINGLE HS TROPONIN T - Normal    Narrative:     High Sensitive Troponin T Reference Range:  <14.0 ng/L- Negative Female for AMI  <22.0 ng/L- Negative Male for AMI  >=14 - Abnormal Female indicating possible myocardial injury.  >=22 - Abnormal Male indicating possible myocardial injury.   Clinicians would have to utilize clinical acumen, EKG, Troponin, and serial changes to determine if it is an Acute Myocardial Infarction or myocardial injury due to an underlying chronic condition.        RAINBOW DRAW    Narrative:     The following orders were created for panel order [place] Draw.  Procedure                               Abnormality         Status                     ---------                               -----------         ------                     Green Top (Gel)[[id number]]                                  Final result               Lavender Top[[id number]]                                     Final result               Gold Top - SST[[id number]]                                   Final result               Light Blue Top[[id number]]                                   Final result                 Please view results for these tests on the individual orders.   CBC AND DIFFERENTIAL    Narrative:     The following orders were created for panel order CBC & Differential.  Procedure                               Abnormality         Status                     ---------                               -----------         ------                     CBC Auto Differential[[id number]]        Normal              Final result                 Please view results for these tests on the individual orders.   GREEN TOP   LAVENDER TOP   GOLD TOP -  SST   LIGHT BLUE TOP     Medications   sodium chloride 0.9 % flush 10 mL (has no administration in time range)   nitroglycerin (NITROSTAT) SL tablet 0.4 mg (has no administration in time range)   sodium chloride 0.9 % flush 10 mL (has no administration in time range)   sodium chloride 0.9 % flush 10 mL (has no administration in time range)   sodium chloride 0.9 % infusion 40 mL (has no administration in time range)   sennosides-docusate (PERICOLACE) 8.6-50 MG per tablet 2 tablet (has no administration in time range)     And   polyethylene glycol (MIRALAX) packet 17 g (has no administration in time range)     And   bisacodyl (DULCOLAX) EC tablet 5 mg (has no administration in time range)     And   bisacodyl (DULCOLAX) suppository 10 mg (has no administration in time range)   sodium chloride 0.9 % infusion (has no administration in time range)   morphine injection 4 mg (has no administration in time range)     And   naloxone (NARCAN) injection 0.4 mg (has no administration in time range)   aspirin EC tablet 81 mg (has no administration in time range)   aspirin tablet 325 mg (325 mg Oral Given 4/6/24 0808)   technetium tetrofosmin (Tc-MYOVIEW) injection 1 dose (1 dose Intravenous Given 4/6/24 1256)   technetium tetrofosmin (Tc-MYOVIEW) injection 1 dose (1 dose Intravenous Given 4/6/24 1355)     XR Chest 1 View    Result Date: 4/6/2024  No radiographic findings of acute cardiopulmonary abnormality. Electronically Signed: Ton Munson  4/6/2024 8:55 AM EDT  Workstation ID: [id number]             HEART Score: 4                              Medical Decision Making  Problems Addressed:  Chest pain, unspecified type: complicated acute illness or injury    Amount and/or Complexity of Data Reviewed  Labs: ordered.  Radiology: ordered.    Risk  OTC drugs.  Prescription drug management.  Decision regarding hospitalization.      EKG interpretation: 7:43 AM, rate 75, normal sinus rhythm, normal axis, borderline prolonged QRS, no  acute ischemic changes.  No significant change from November 4, 2020 EKG.    Interpretation of chest x-ray is no focal infiltrate or pneumothorax.  See system for radiology dictation.    Heart score 3 - 4 (unclear family history is true risk factor) but not low risk by ED ACS score.  Excepted by Dr. Calderon.    Final diagnoses:   Chest pain, unspecified type       ED Disposition  ED Disposition       ED Disposition   Decision to Admit    Condition   --    Comment   Level of Care: Telemetry [5]   Diagnosis: Chest pain [737991]   Admitting Physician: KADE CALDERON [[id number]]   Attending Physician: KADE CALDERON [[id number]]                 No follow-up provider specified.       Medication List      No changes were made to your prescriptions during this visit.            Lg Dave MD  04/06/24 1720

## 2024-04-07 LAB
QT INTERVAL: 382 MS
QTC INTERVAL: 426 MS